# Patient Record
Sex: FEMALE | Race: WHITE
[De-identification: names, ages, dates, MRNs, and addresses within clinical notes are randomized per-mention and may not be internally consistent; named-entity substitution may affect disease eponyms.]

---

## 2017-06-22 ENCOUNTER — HOSPITAL ENCOUNTER (EMERGENCY)
Dept: HOSPITAL 58 - ED | Age: 32
Discharge: HOME | End: 2017-06-22

## 2017-06-22 VITALS — BODY MASS INDEX: 25 KG/M2

## 2017-06-22 VITALS — TEMPERATURE: 98.4 F | SYSTOLIC BLOOD PRESSURE: 104 MMHG | DIASTOLIC BLOOD PRESSURE: 71 MMHG
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
## 2017-06-22 DIAGNOSIS — F17.210: ICD-10-CM

## 2017-06-22 DIAGNOSIS — S71.111A: Primary | ICD-10-CM

## 2017-06-22 PROCEDURE — 99283 EMERGENCY DEPT VISIT LOW MDM: CPT

## 2017-06-22 NOTE — ED.PDOC
General


ED Provider: 


Dr. RAMSES ROCHA-ER





Chief Complaint: Laceration


Stated Complaint: i accidentally stabbed my thigh with a steak knife


Time Seen by Physician: 02:26


Mode of Arrival: Walk-In


Information Source: Patient


Exam Limitations: No limitations


Primary Care Provider: 


[facility]





Nursing and Triage Documentation Reviewed and Agree: Yes





Skin Complaint Exam





- Laceration/Lower Ext. Complaint/Exam


Location of Injury: Right, Thigh


Mechanism of Injury: Laceration


Onset/Duration: 1hr


Symptoms Are: Still present


Initial Severity: Mild


Current Severity: Mild


Aggravating: Movement


Alleviating: Compression


Associated Signs and Symptoms: Denies: Fever, Chills, Erythema, Numbness, 

Tingling


Differential Diagnoses: Laceration





Review of Systems





- Review Of Systems


Constitutional: Reports: No symptoms


Eyes: Reports: No symptoms


Ears, Nose, Mouth, Throat: Reports: No symptoms


Respiratory: Reports: No symptoms


Cardiac: Reports: No symptoms


GI: Reports: No symptoms


: Reports: No symptoms


Musculoskeletal: Reports: No symptoms


Skin: Reports: No symptoms


Neurological: Reports: No symptoms


Endocrine: Reports: No symptoms


Hematologic/Lymphatic: Reports: No symptoms


All Other Systems: Reviewed and Negative





Past Medical History





- Past Medical History


Previously Healthy: Yes


Endocrine: Reports: Dyslipidemia


Cardiovascular: Reports: None


Respiratory: Reports: None


Hematological: Reports: None


Gastrointestinal: Reports: None


Genitourinary: Reports: None


Neuro/Psych: Reports: None


Musculoskeletal: Reports: None


Cancer: Reports: None


Last Menstrual Period: 5 yrs ago





- Surgical History


General Surgical History: Reports: Tubal ligation, Tonsillectomy, Adenoidectomy

, Unknown





- Family History


Family History: Reports: Unknown





- Social History


Smoking Status: Current every day smoker, Heavy tobacco smoker


Hx Substance Use: No


Alcohol Screening: Occasionally


Lives: With family





- Immunizations


Tetanus Shot up to Date: Yes (received at clinic last year)





Physical Exam





- Physical Exam


Appearance: Well-appearing, No pain distress, Well-nourished


Pain Distress: Mild


Eyes: KALEIGH, EOMI, Conjunctiva clear


ENT: Ears normal, Nose normal, Oropharynx normal


Neck: Supple


Respiratory: Airway patent, Breath sounds clear, Breath sounds equal, 

Respirations nonlabored


Cardiovascular: RRR


GI/: Soft, Nontender, No masses, Bowel sounds normal, No Organomegaly


Musculoskeletal: Normal strength


Skin: Warm, Dry, Normal color


Neurological: Sensation intact, Motor intact, Reflexes intact, Cranial nerves 

intact, Alert, Oriented


Psychiatric: Affect appropriate, Mood appropriate





Procedures





- Laceration/Wound Repair


  ** No standard instances


Wound Description: Linear


Wound Length (cm): 1cm right thigh


Wound Explored: Clean


Wound Irrigated: No


Wound Prep: Hibiclens


Anesthesia: Lidocaine


Wound Repaired With: Sutures


Suture Size and Type: 2 4.0 prolene


Number of Sutures: 2


Layer Closure?: No


Sterile Dressing Applied?: Yes


Splint Applied?: No


Sling Applied?: No





Critical Care Note





- Critical Care Note


Total Time (mins): 0





Course





- Course


Orders, Labs, Meds: 





Orders











 Category Date Time Status


 


 Lidocaine HCl/Pf [Lidocaine 1 % Amp 5 ml (Sutures)] MEDS  06/22/17 02:14 

Discontinued





 5 ml SUBCUT ONCE STA   


 


 Tetanus, Diphtheria Tox,Adult [Tetanus Diphtheria MEDS  06/22/17 02:16 Once





 Toxoids]   





 0.5 ml IM .ONCE ONE   








Medications














Discontinued Medications














Generic Name Dose Route Start Last Admin





  Trade Name Freq  PRN Reason Stop Dose Admin


 


Lidocaine HCl  5 ml  06/22/17 02:14  06/22/17 02:24





  Lidocaine 1 % Amp 5 Ml (Sutures)  SUBCUT  06/22/17 02:15  5 ml





  ONCE STA   Administration


 


Tetanus/Diphtheria Toxoids  0.5 ml  06/22/17 02:16  





  Tetanus Diphtheria Toxoids  IM  06/22/17 02:17  





  .ONCE ONE   











Vital Signs: 





 











  Temp Pulse Resp BP Pulse Ox


 


 06/22/17 02:13  98.4 F  70  20  104/71  97














Departure





- Departure


Time of Disposition: 02:28


Disposition: HOME SELF-CARE


Discharge Problem: 


 Laceration - injury





Instructions:  Laceration (ED), Care For Your Stitches (ED)


Condition: Good


Pt referred to PMD for follow-up: Yes


Additional Instructions: 


suture care--out in 7 days--return if any signs of infection


Allergies/Adverse Reactions: 


Allergies





pseudoephedrine HCl [From Sudafed] Adverse Reaction (Verified 06/22/17 02:20)


 


 STATES FEELS NUMBNESS AND TINGLING WHEN SHE TAKES 








Home Medications: 


Ambulatory Orders





Ibuprofen 800 mg PO PRN 05/25/17 








Disposition Discussed With: Patient

## 2017-09-01 ENCOUNTER — HOSPITAL ENCOUNTER (EMERGENCY)
Dept: HOSPITAL 58 - ED | Age: 32
LOS: 1 days | Discharge: HOME | End: 2017-09-02

## 2017-09-01 VITALS — SYSTOLIC BLOOD PRESSURE: 135 MMHG | DIASTOLIC BLOOD PRESSURE: 74 MMHG | TEMPERATURE: 98.7 F

## 2017-09-01 VITALS — BODY MASS INDEX: 25 KG/M2

## 2017-09-01 DIAGNOSIS — K57.92: Primary | ICD-10-CM

## 2017-09-01 DIAGNOSIS — F17.210: ICD-10-CM

## 2017-09-01 PROCEDURE — 83690 ASSAY OF LIPASE: CPT

## 2017-09-01 PROCEDURE — 84703 CHORIONIC GONADOTROPIN ASSAY: CPT

## 2017-09-01 PROCEDURE — 81001 URINALYSIS AUTO W/SCOPE: CPT

## 2017-09-01 PROCEDURE — 99283 EMERGENCY DEPT VISIT LOW MDM: CPT

## 2017-09-01 PROCEDURE — 80053 COMPREHEN METABOLIC PANEL: CPT

## 2017-09-01 PROCEDURE — 82150 ASSAY OF AMYLASE: CPT

## 2017-09-01 PROCEDURE — 85025 COMPLETE CBC W/AUTO DIFF WBC: CPT

## 2017-09-01 PROCEDURE — 96374 THER/PROPH/DIAG INJ IV PUSH: CPT

## 2017-09-01 PROCEDURE — 96361 HYDRATE IV INFUSION ADD-ON: CPT

## 2017-09-01 PROCEDURE — 96375 TX/PRO/DX INJ NEW DRUG ADDON: CPT

## 2017-09-01 PROCEDURE — 36415 COLL VENOUS BLD VENIPUNCTURE: CPT

## 2017-09-01 PROCEDURE — 85651 RBC SED RATE NONAUTOMATED: CPT

## 2017-09-02 LAB
ADD URINE MICROSCOPIC: NO
ALBUMIN SERPL-MCNC: 3.3 G/DL (ref 3.4–5)
ALBUMIN/GLOB SERPL: 1.1 {RATIO}
ALP SERPL-CCNC: 25 U/L (ref 42–98)
ALT SERPL-CCNC: 13 U/L (ref 12–78)
AMYLASE SERPL-CCNC: 53 U/L (ref 25–115)
ANION GAP SERPL CALC-SCNC: 18.9 MMOL/L
AST SERPL-CCNC: 14 U/L (ref 15–37)
BASOPHILS # BLD AUTO: 0 K/UL (ref 0–0.2)
BASOPHILS NFR BLD AUTO: 0.4 % (ref 0–3)
BILIRUB SERPL-MCNC: 0.15 MG/DL (ref 0–1.2)
BUN SERPL-MCNC: 17 MG/DL (ref 7–18)
BUN/CREAT SERPL: 19.76
CALCIUM SERPL-MCNC: 9.1 MG/DL (ref 8.2–10.2)
CHLORIDE SERPL-SCNC: 106 MMOL/L (ref 98–107)
CO2 BLD-SCNC: 21 MMOL/L (ref 21–32)
CREAT SERPL-MCNC: 0.86 MG/DL (ref 0.6–1.3)
EOSINOPHIL # BLD AUTO: 0.2 K/UL (ref 0–0.7)
EOSINOPHIL NFR BLD AUTO: 1.9 % (ref 0–7)
ERYTHROCYTE [SEDIMENTATION RATE] IN BLOOD: 16 MM/HR (ref 0–20)
ESR INTERNAL QC: (no result)
GFR SERPLBLD BASED ON 1.73 SQ M-ARVRAT: 76 ML/MIN
GLOBULIN SER CALC-MCNC: 3 G/L
GLUCOSE SERPL-MCNC: 103 MG/DL (ref 70–110)
HCT VFR BLD AUTO: 36.2 % (ref 37–47)
HGB BLD-MCNC: 12.7 G/DL (ref 12–16)
IMM GRANULOCYTES NFR BLD AUTO: 0.3 % (ref 0–5)
LIPASE SERPL-CCNC: 29 U/L (ref 8–78)
LYMPHOCYTES # SPEC AUTO: 3.4 K/UL (ref 0.6–3.4)
LYMPHOCYTES NFR BLD AUTO: 31.7 % (ref 10–50)
MCH RBC QN: 30.4 PG (ref 27–31)
MCHC RBC AUTO-ENTMCNC: 35.1 G/DL (ref 31.8–35.4)
MCV RBC: 86.6 FL (ref 81–99)
MONOCYTES # BLD AUTO: 0.7 K/UL (ref 0.4–2)
MONOCYTES NFR BLD AUTO: 6.9 % (ref 0–10)
NEUTROPHILS # BLD AUTO: 6.3 K/UL (ref 2–6.9)
NEUTROPHILS NFR BLD AUTO: 58.8 %
PH UR: 5 [PH] (ref 5–9)
PLATELET # BLD AUTO: 230 10^3/UL (ref 140–440)
POTASSIUM SERPL-SCNC: 3.9 MMOL/L (ref 3.5–5.1)
PROT SERPL-MCNC: 6.3 G/DL (ref 6.4–8.2)
RBC # BLD AUTO: 4.18 10^6/UL (ref 4.2–5.4)
SERUM PREGNANCY INTERNAL QC: (no result)
SODIUM SERPL-SCNC: 142 MMOL/L (ref 136–145)
SP GR UR: 1.02 (ref 1–1.03)
WBC # BLD AUTO: 10.71 K/UL (ref 4.6–10.2)

## 2017-09-02 RX ADMIN — Medication PRN SYR: at 00:10

## 2017-09-02 RX ADMIN — Medication PRN SYR: at 00:18

## 2017-09-02 NOTE — CT
Exam:  CT of the abdomen and pelvis without and with contrast 

  

History:  Right lower quadrant pain 

  

Technique:  3 mm CT of the abdomen and pelvis without and with intravascular contrast 

  

FINDINGS: The lung bases are clear.  The gallbladder appears normal. Kidneys and proximal collecting
 system are unremarkable. The appendix is normal.  Inflammation of the right colonic mesentery and t
race fluid in the right paracolic gutter.  Central offending saccular diverticula is present.  Right
 colonic diverticulosis noted.  Trace atherosclerotic calcification of the aorta. 

  

Pelvic genitourinary structures appear normal. Pelvic bowel loops are unremarkable. No inflammatory 
change in the pelvic fat. No acute abnormality of the abdominal or pelvic skeleton. 

  

Impression: 

1.  Right colonic diverticulitis.  No evidence of abscess or free intraperitoneal gas.

## 2017-09-02 NOTE — ED.PDOC
General


ED Provider: 


Dr. RAMSES ROCHA-ER





Chief Complaint: Abdominal Pain


Stated Complaint: mari been hurting


Time Seen by Physician: 23:35


Mode of Arrival: Walk-In


Information Source: Patient


Exam Limitations: No limitations


[facility]





Nursing and Triage Documentation Reviewed and Agree: Yes





GI Complaint Exam





- Abdominal Pain Complaint/Exam


Onset: Gradual


Duration: several days


Symptoms Are: Still present


Timing: Intermittent


Initial Severity: Mild


Current Severity: Mild


Location of Pain: RLQ


Character: Reports: Dull, Aching


Alleviating: Reports: None


Associated Signs and Symptoms: Denies: Diaphoresis, Fever, Cough, Chest pain, 

Dizziness, Back pain, Constipation, Blood in stool, Dysuria, Urinary frequency, 

Decreased urine output, Decreased appetite, Vaginal bleeding, Vaginal discharge

, Nausea, Vomiting, Diarrhea, Sore throat, Decreased activity


AAA Risk Factors: Reports: None


Patient Rh Status: Unknown


Abdominal Findings: Present: None





Review of Systems





- Review Of Systems


Constitutional: Reports: No symptoms


Eyes: Reports: No symptoms


Ears, Nose, Mouth, Throat: Reports: No symptoms


Respiratory: Reports: No symptoms


Cardiac: Reports: No symptoms


GI: Reports: Abdominal pain


: Reports: No symptoms


Musculoskeletal: Reports: No symptoms


Skin: Reports: No symptoms


Neurological: Reports: No symptoms


Endocrine: Reports: No symptoms


Hematologic/Lymphatic: Reports: No symptoms


All Other Systems: Reviewed and Negative





Past Medical History





- Past Medical History


Previously Healthy: Yes


Endocrine: Reports: Dyslipidemia


Cardiovascular: Reports: None


Respiratory: Reports: None


Hematological: Reports: None


Gastrointestinal: Reports: None


Genitourinary: Reports: None


Neuro/Psych: Reports: None


Musculoskeletal: Reports: None


Cancer: Reports: None


Last Menstrual Period: PRESENTLY





- Surgical History


General Surgical History: Reports: Tubal ligation, Tonsillectomy, Adenoidectomy

, Unknown





- Family History


Family History: Reports: Unknown





- Social History


Smoking Status: Current every day smoker, Heavy tobacco smoker


Hx Substance Use: No


Alcohol Screening: None


Lives: With family





- Immunizations


Tetanus Shot up to Date: Yes





Physical Exam





- Physical Exam


Appearance: Well-appearing, No pain distress, Well-nourished


Pain Distress: Mild


Eyes: KALEIGH, EOMI, Conjunctiva clear


ENT: Ears normal, Nose normal, Oropharynx normal


Neck: Supple


Respiratory: Airway patent, Breath sounds clear, Breath sounds equal, 

Respirations nonlabored


Cardiovascular: RRR


GI/: Soft, Nontender, No masses, Bowel sounds normal, No Organomegaly


Musculoskeletal: Normal strength, ROM intact, No edema, No calf tenderness


Skin: Warm


Neurological: Sensation intact


Psychiatric: Affect appropriate





Interpretation





- Radiology Interpretation


Radiology Interpretation By: Radiologist


Radiology Results: Negative


Exam Interpreted: CT Scan





Critical Care Note





- Critical Care Note


Total Time (mins): 0





Course





- Course


Hematology/Chemistry: 


 09/02/17 00:05





 09/02/17 00:05


Orders, Labs, Meds: 





Lab Review











  09/02/17





  00:05


 


WBC  10.71 H


 


RBC  4.18 L


 


Hgb  12.7


 


Hct  36.2 L


 


MCV  86.6


 


MCH  30.4


 


MCHC  35.1


 


RDW Coeff of Tray  13.4


 


Plt Count  230


 


Immature Gran % (Auto)  0.3


 


Neut % (Auto)  58.8


 


Lymph % (Auto)  31.7


 


Mono % (Auto)  6.9


 


Eos % (Auto)  1.9


 


Baso % (Auto)  0.4


 


Immature Gran # (Auto)  0.0


 


Neut #  6.3


 


Lymph #  3.4


 


Mono #  0.7


 


Eos #  0.2


 


Baso #  0.0


 


ESR  16


 


Sodium  142


 


Potassium  3.9


 


Chloride  106


 


Carbon Dioxide  21


 


Anion Gap  18.9


 


BUN  17


 


Creatinine  0.86


 


Estimated GFR (MDRD)  76.00


 


BUN/Creatinine Ratio  19.76


 


Glucose  103


 


Calcium  9.1


 


Total Bilirubin  0.15


 


AST  14 L


 


ALT  13


 


Alkaline Phosphatase  25 L


 


Total Protein  6.3 L


 


Albumin  3.3 L


 


Globulin  3.0


 


Albumin/Globulin Ratio  1.10


 


Amylase  53


 


Lipase  29


 


Serum Pregnancy, Qual  Negative


 


Urine Color  Yellow


 


Urine Clarity  Clear


 


Urine pH  5.0


 


Ur Specific Gravity  1.025


 


Urine Protein  Negative


 


Urine Glucose (UA)  Negative


 


Urine Ketones  Negative


 


Urine Blood  Negative


 


Urine Nitrite  Negative


 


Urine Bilirubin  Negative


 


Urine Urobilinogen  0.2


 


Ur Leukocyte Esterase  Negative








Orders











 Category Date Time Status


 


 NPO REMINDER: IMAGING ONCE CARE  09/01/17 23:55 Completed


 


 ED IV/MEDIPORT/POWERPORT .ONCE EMERGENCY  09/01/17 23:54 Active


 


 AMYLASE Stat LAB  09/01/17 23:54 Completed


 


 CBC W/ AUTO DIFF Stat LAB  09/01/17 23:54 Completed


 


 COMPREHENSIVE METABOLIC PANEL Stat LAB  09/01/17 23:54 Completed


 


 ESR Stat LAB  09/01/17 23:54 Completed


 


 LIPASE Stat LAB  09/01/17 23:54 Completed


 


 SERUM PREGNANCY Stat LAB  09/01/17 23:54 Completed


 


 URINALYSIS C & S IF INDICATED Stat LAB  09/02/17 00:05 Completed


 


 0.9 % Sodium Chloride [Saline Flush] MEDS  09/01/17 23:54 Ordered





 1 syr IVF PRN PRN   


 


 Morphine Sulfate [Morphine 2 mg/ml Syringe] MEDS  09/01/17 23:55 Discontinued





 2 mg IVP ONCE STA   


 


 Ondansetron HCl/Pf [Zofran 4 mg/2 ml] MEDS  09/01/17 23:55 Discontinued





 4 mg IVP ONCE STA   


 


 Sodium Chloride 0.9% [Sodium Chloride] 1,000 ml MEDS  09/01/17 23:54 Active





  mls/hr   


 


 CT ABDOMEN/PELVIS W/WO CONTRAS Stat RADS  09/01/17 23:54 Completed








Medications











Generic Name Dose Route Start Last Admin





  Trade Name Freq  PRN Reason Stop Dose Admin


 


Sodium Chloride  1,000 mls @ 100 mls/hr  09/01/17 23:54  09/02/17 00:12





  Sodium Chloride  IV  09/02/17 09:53  100 mls/hr





  .Q10H STA   Administration


 


Sodium Chloride  1 syr  09/01/17 23:54  09/02/17 00:18





  Saline Flush  IVF   1 syr





  PRN PRN   Administration





  To flush IV   














Discontinued Medications














Generic Name Dose Route Start Last Admin





  Trade Name Freq  PRN Reason Stop Dose Admin


 


Morphine Sulfate  2 mg  09/01/17 23:55  09/02/17 00:14





  Morphine 2 Mg/Ml Syringe  IVP  09/01/17 23:56  2 mg





  ONCE STA   Administration


 


Ondansetron HCl  4 mg  09/01/17 23:55  09/02/17 00:13





  Zofran 4 Mg/2 Ml  IVP  09/01/17 23:56  4 mg





  ONCE STA   Administration











Vital Signs: 





 











  Temp Pulse Resp BP Pulse Ox


 


 09/01/17 23:32  98.7 F  69  18  135/74  98














Departure





- Departure


Time of Disposition: 01:59


Disposition: HOME SELF-CARE


Discharge Problem: 


Diverticulitis


Qualifiers:


 Diverticulitis site: unspecified part of intestinal tract Diverticulitis 

bleeding: without bleeding Diverticulitis complication: without perforation or 

abscess Qualifier Code: (K57.92) Diverticulitis of intestine, part unspecified, 

without perforation or abscess without bleeding





Instructions:  Diverticulitis (ED)


Condition: Good


Pt referred to PMD for follow-up: Yes


Additional Instructions: 


cipro 500mg bid x 7days--flagyl 500mg tid x 7 days--librax q 8hrs prn pain #15--

f/u with clinic next week


Allergies/Adverse Reactions: 


Allergies





pseudoephedrine HCl [From Sudafed] Adverse Reaction (Verified 09/01/17 23:43)


 


 STATES FEELS NUMBNESS AND TINGLING WHEN SHE TAKES 








Home Medications: 


Ambulatory Orders





1 [No Reported Medications]  09/01/17 








Disposition Discussed With: Patient

## 2018-05-27 ENCOUNTER — HOSPITAL ENCOUNTER (EMERGENCY)
Dept: HOSPITAL 58 - ED | Age: 33
Discharge: HOME | End: 2018-05-27

## 2018-05-27 VITALS — TEMPERATURE: 97.5 F | DIASTOLIC BLOOD PRESSURE: 76 MMHG | SYSTOLIC BLOOD PRESSURE: 126 MMHG

## 2018-05-27 VITALS — BODY MASS INDEX: 25.2 KG/M2
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
## 2018-05-27 DIAGNOSIS — F17.210: ICD-10-CM

## 2018-05-27 DIAGNOSIS — L30.9: Primary | ICD-10-CM

## 2018-05-27 DIAGNOSIS — S90.861A: ICD-10-CM

## 2018-05-27 DIAGNOSIS — S40.861A: ICD-10-CM

## 2018-05-27 PROCEDURE — 96372 THER/PROPH/DIAG INJ SC/IM: CPT

## 2018-05-27 PROCEDURE — 99282 EMERGENCY DEPT VISIT SF MDM: CPT

## 2018-05-27 NOTE — ED.PDOC
General


ED Provider: 


Dr. TRISTEN WILLIAMSON





Chief Complaint: Bite


Stated Complaint: Patient is a 33 year old female who comes to the Er with a 

possible bite on the right arm and right heel. States it is now itiching 

slightly raised and read.


Time Seen by Physician: 23:01


Mode of Arrival: Walk-In


Information Source: Patient


Exam Limitations: No limitations


[facility] 


[facility]





Nursing and Triage Documentation Reviewed and Agree: No


Reviewed sepsis parameters & appropriate labs ordered?: No


System Inflammatory Response Syndrome: Not Applicable


Sepsis Protocol: 


For patient's 13 years and over:





Temp is 96.8 and below  and greater


Pulse >90 BPM


Resp >20/minute


Acutely Altered Mental Status





Are patient's symptoms suggestive of a new infection, such as:


   -Pneumonia


   -Skin, Soft Tissue


   -Endocarditis


   -UTI


   -Bone, Joint Infection


   -Implantable Device


   -Acute Abdominal Infection


   -Wound Infection


   -Meningitis


   -Blood Stream Catheter Infection


   -Unknown





System Inflammatory Response Syndrome: Not Applicable





Skin Complaint Exam





- Skin Rash/Itching Complaint/Exam


Onset/Duration: 2 days 


Symptoms Are: Still present


Initial Severity: Moderate


Current Severity: Moderate


Location: Rigth arm- Triceps area and right heel 


Potential Exposures: Reports: Insect bite


Prior Treatment: Bendryl 3 pm 


Aggravating: Reports: Showering


Associated Signs and Symptoms: Denies: Difficulty breathing, Fever, Chills


Skin Findings: Present: Urticaria, Maculae


Body Picture: 


  __________________________














  __________________________





 1 - swelling raised and itchy.





Differential Diagnoses: Allergic Reaction, Contact Dermatitis, Drug Rash, 

Urticaria





Review of Systems





- Review Of Systems


Constitutional: Reports: No symptoms


Eyes: Reports: No symptoms


Ears, Nose, Mouth, Throat: Reports: No symptoms


Respiratory: Reports: No symptoms


Cardiac: Reports: No symptoms


GI: Reports: No symptoms


: Reports: No symptoms


Musculoskeletal: Reports: No symptoms


Skin: Reports: Rash


Neurological: Reports: No symptoms


Endocrine: Reports: No symptoms


Hematologic/Lymphatic: Reports: No symptoms


All Other Systems: Reviewed and Negative





Past Medical History





- Past Medical History


Previously Healthy: Yes


Endocrine: Reports: Dyslipidemia


Cardiovascular: Reports: None


Respiratory: Reports: None


Hematological: Reports: None


Gastrointestinal: Reports: GERD, Other (H pyroli )


Genitourinary: Reports: None


Neuro/Psych: Reports: Anxiety


Musculoskeletal: Reports: None


Cancer: Reports: None


Last Menstrual Period: 1 week ago





- Surgical History


General Surgical History: Reports: Tubal ligation, Tonsillectomy, Adenoidectomy

, Other (Breast augmentation. Uterine ablation, D and C )





- Family History


Family History: Reports: Unknown





- Social History


Smoking Status: Current every day smoker, Heavy tobacco smoker


Hx Substance Use: No


Alcohol Screening: None





- Immunizations


Tetanus Shot up to Date: Yes





Physical Exam





- Physical Exam


Appearance: Ill-appearing


Ill-appearing: Moderate


Pain Distress: Mild


Eyes: KALEIGH, EOMI, Conjunctiva clear


ENT: Ears normal, Nose normal, Oropharynx normal


Neck: Supple


Respiratory: Airway patent, Breath sounds clear, Breath sounds equal, 

Respirations nonlabored


Cardiovascular: RRR


GI/: Soft, Nontender, No masses, Bowel sounds normal, No Organomegaly


Musculoskeletal: Normal strength, ROM intact, No edema, No calf tenderness


Skin: Warm, Dry


Psychiatric: Anxious





Critical Care Note





- Critical Care Note


Total Time (mins): 0





Course





- Course


Orders, Labs, Meds: 


Orders











 Category Date Time Status


 


 Ceftriaxone Sodium [Rocephin] MEDS  05/27/18 23:01 Discontinued





 1 gm IM ONCE STA   


 


 Diphenhydramine HCl [Benadryl] MEDS  05/27/18 23:02 Discontinued





 50 mg PO ONCE STA   


 


 Lidocaine HCl/Pf [Lidocaine HCl 1% Sdv] MEDS  05/27/18 23:01 Discontinued





 2.1 ml IM ONCE STA   


 


 Methylprednisolone Sod Succ/Pf [Solu-Medrol 125 mg] MEDS  05/27/18 23:01 

Discontinued





 125 mg IM ONCE STA   








Medications














Discontinued Medications














Generic Name Dose Route Start Last Admin





  Trade Name Freq  PRN Reason Stop Dose Admin


 


Ceftriaxone Sodium  1 gm  05/27/18 23:01  05/27/18 23:11





  Rocephin  IM  05/27/18 23:02  1 gm





  ONCE STA   Administration





     





     





     





     


 


Diphenhydramine HCl  50 mg  05/27/18 23:02  05/27/18 23:09





  Benadryl  PO  05/27/18 23:03  50 mg





  ONCE STA   Administration





     





     





     





     


 


Lidocaine HCl  2.1 ml  05/27/18 23:01  05/27/18 23:11





  Lidocaine Hcl 1% Sdv  IM  05/27/18 23:02  2.1 ml





  ONCE STA   Administration





     





     





     





     


 


Methylprednisolone Sodium Succinate  125 mg  05/27/18 23:01  05/27/18 23:11





  Solu-Medrol 125 Mg  IM  05/27/18 23:02  125 mg





  ONCE STA   Administration





     





     





     





     











Vital Signs: 


 











  Temp Pulse Resp BP Pulse Ox


 


 05/27/18 22:33  97.5 F L  88  20  126/76  98














Departure





- Departure


Time of Disposition: 23:30


Disposition: HOME SELF-CARE


Discharge Problem: 


 Dermatitis





Instructions:  Insect Bite or Sting (ED)


Condition: Stable


Pt referred to PMD for follow-up: Yes


IPMP verified?: No


Additional Instructions: 


Take Medication as prescribed 


Follow up with PCP in 3 days. 


Prescriptions: 


Cephalexin [Keflex] 500 mg PO Q8HR #30 capsule


Methylprednisolone [Medrol Dosepak] 4 mg PO AS DIRECTED #1 pkg


Allergies/Adverse Reactions: 


Allergies





pseudoephedrine HCl [From Sudafed] Adverse Reaction (Verified 05/27/18 22:39)


 


 STATES FEELS NUMBNESS AND TINGLING WHEN SHE TAKES 








Home Medications: 


Ambulatory Orders





Cephalexin [Keflex] 500 mg PO Q8HR #30 capsule 05/27/18 


Methylprednisolone [Medrol Dosepak] 4 mg PO AS DIRECTED #1 pkg 05/27/18 








Disposition Discussed With: Patient, Family

## 2019-06-21 ENCOUNTER — HOSPITAL ENCOUNTER (EMERGENCY)
Dept: HOSPITAL 58 - ED | Age: 34
Discharge: HOME | End: 2019-06-21

## 2019-06-21 VITALS — TEMPERATURE: 98.1 F | DIASTOLIC BLOOD PRESSURE: 85 MMHG | SYSTOLIC BLOOD PRESSURE: 147 MMHG

## 2019-06-21 VITALS — BODY MASS INDEX: 22.2 KG/M2

## 2019-06-21 DIAGNOSIS — F43.0: Primary | ICD-10-CM

## 2019-06-21 DIAGNOSIS — F17.210: ICD-10-CM

## 2019-06-21 PROCEDURE — 85025 COMPLETE CBC W/AUTO DIFF WBC: CPT

## 2019-06-21 PROCEDURE — 84443 ASSAY THYROID STIM HORMONE: CPT

## 2019-06-21 PROCEDURE — 36415 COLL VENOUS BLD VENIPUNCTURE: CPT

## 2019-06-21 PROCEDURE — 93010 ELECTROCARDIOGRAM REPORT: CPT

## 2019-06-21 PROCEDURE — 99283 EMERGENCY DEPT VISIT LOW MDM: CPT

## 2019-06-21 PROCEDURE — 84703 CHORIONIC GONADOTROPIN ASSAY: CPT

## 2019-06-21 PROCEDURE — 80053 COMPREHEN METABOLIC PANEL: CPT

## 2019-06-21 PROCEDURE — 96372 THER/PROPH/DIAG INJ SC/IM: CPT

## 2019-06-21 PROCEDURE — 80307 DRUG TEST PRSMV CHEM ANLYZR: CPT

## 2019-06-21 PROCEDURE — 81001 URINALYSIS AUTO W/SCOPE: CPT

## 2019-06-21 PROCEDURE — 80306 DRUG TEST PRSMV INSTRMNT: CPT

## 2019-06-21 PROCEDURE — 93005 ELECTROCARDIOGRAM TRACING: CPT

## 2019-06-21 NOTE — ED.PDOC
General


ED Provider: 


Dr. SHIRA TOVAR





Chief Complaint: Non-specific Complaint


Stated Complaint: anxiety


Time Seen by Physician: 10:00


Mode of Arrival: Walk-In


Information Source: Patient


Exam Limitations: No limitations


Primary Care Provider: 


ALISHA HURT





Nursing and Triage Documentation Reviewed and Agree: Yes


Does patient meet sepsis criteria?: No


System Inflammatory Response Syndrome: Not Applicable


Sepsis Protocol: 


For patient's 13 years and over:





Temp is 96.8 and below  and greater


Pulse >90 BPM


Resp >20/minute


Acutely Altered Mental Status





Are patient's symptoms suggestive of a new infection, such as:


   -Pneumonia


   -Skin, Soft Tissue


   -Endocarditis


   -UTI


   -Bone, Joint Infection


   -Implantable Device


   -Acute Abdominal Infection


   -Wound Infection


   -Meningitis


   -Blood Stream Catheter Infection


   -Unknown








Psychological Complaint Exam





- Psychiatric Complaint/Exam


Patient Complains Of: Present: Depression, Other (panic attack)


Onset/Duration: today


Symptoms Are: Still present


Timing: Intermittent


Episodes Lasting: Minutes


Initial Severity: Moderate


Current Severity: Moderate


Character: Present: Depressed, Fearful, Anxious, Frustrated


Aggravating: Reports: Recent stress


Associated Signs And Symptoms: Reports: Sleep disturbance, Appetite change.  

Denies: Hostile, Confused, Hallucinating, Paranoid behavior


Related History: Denies: Suicidal thoughts, Suicidal plan, Suicidal gestures, 

Homicidal thoughts, Homicidal plan, Homicidal gestures, Prior attempts, Recent 

stressors, Drug ingestion


Completed Suicide Risk Factors: None


Patient Accompanied By: Family


Patient In Custody Of Police: No


Social Withdrawal Present: No


Social Isolation Present: No


Prior Suicide Attempt: No


Injury From Prior Suicide Attempt: No


Related Surgical History: Reports: None


Patient Uncooperative For Exam: No


Mood: Present: Anxious


Appearance: Present: Clean


Thought Process: Present: Logical


Insight: Present: Good


Memory: Intact


Judgement: Normal


Danger To Others: No


Patient Medically Stable For: Psych evaluation


Differential Diagnoses: Anxiety





Review of Systems





- Review Of Systems


Constitutional: Reports: No symptoms


Eyes: Reports: No symptoms


Ears, Nose, Mouth, Throat: Reports: No symptoms


Respiratory: Reports: No symptoms


Cardiac: Reports: No symptoms


GI: Reports: No symptoms


: Reports: No symptoms


Musculoskeletal: Reports: No symptoms


Skin: Reports: No symptoms


Neurological: Reports: Anxiety, Emotional problems


Endocrine: Reports: No symptoms


Hematologic/Lymphatic: Reports: No symptoms


All Other Systems: Reviewed and Negative





Past Medical History





- Past Medical History


Previously Healthy: Yes


Endocrine: Reports: Dyslipidemia


Cardiovascular: Reports: None


Respiratory: Reports: None


Hematological: Reports: None


Gastrointestinal: Reports: GERD, Other (H pyroli )


Genitourinary: Reports: None


Neuro/Psych: Reports: Anxiety


Musculoskeletal: Reports: None


Cancer: Reports: None


Last Menstrual Period: 2 weeks





- Surgical History


General Surgical History: Reports: Tubal ligation, Tonsillectomy, Adenoidectomy

, Other (Breast augmentation. Uterine ablation, D and C )





- Family History


Family History: Reports: Unknown





- Social History


Smoking Status: Current every day smoker, Heavy tobacco smoker


Hx Substance Use: No


Alcohol Screening: Occasionally





Physical Exam





- Physical Exam


Appearance: Well-appearing, No pain distress, Well-nourished


Eyes: KALEIGH, EOMI, Conjunctiva clear


ENT: Ears normal, Nose normal, Oropharynx normal


Respiratory: Airway patent, Breath sounds clear, Breath sounds equal, 

Respirations nonlabored


Cardiovascular: RRR, Pulses normal, No rub, No murmur


GI/: Soft, Nontender, No masses, Bowel sounds normal, No Organomegaly


Musculoskeletal: Normal strength, ROM intact, No edema, No calf tenderness


Skin: Warm, Dry, Normal color


Neurological: Sensation intact, Motor intact, Reflexes intact, Cranial nerves 

intact, Alert, Oriented


Psychiatric: Affect appropriate, Mood appropriate





Critical Care Note





- Critical Care Note


Total Time (mins): 0





Course





- Course


Hematology/Chemistry: 


 06/21/19 11:18





 06/21/19 11:18


Orders, Labs, Meds: 





Lab Review











  06/21/19 06/21/19 06/21/19





  11:12 11:18 11:18


 


WBC   8.51 


 


RBC   4.69 


 


Hgb   14.5 


 


Hct   42.1 


 


MCV   89.8 


 


MCH   30.9 


 


MCHC   34.4 


 


RDW Coeff of Tray   13.2 


 


Plt Count   236 


 


Immature Gran % (Auto)   0.1 


 


Neut % (Auto)   65.9 


 


Lymph % (Auto)   26.0 


 


Mono % (Auto)   7.1 


 


Eos % (Auto)   0.4 


 


Baso % (Auto)   0.5 


 


Immature Gran # (Auto)   0.0 


 


Neut # (Auto)   5.6 


 


Lymph # (Auto)   2.2 


 


Mono # (Auto)   0.6 


 


Eos # (Auto)   0.0 


 


Baso # (Auto)   0.0 


 


Sodium    138.6


 


Potassium    4.41


 


Chloride    105.6


 


Carbon Dioxide    22.1


 


Anion Gap    15.31


 


BUN    18.8 H


 


Creatinine    0.70


 


Estimated GFR (MDRD)    95.00


 


BUN/Creatinine Ratio    26.85


 


Glucose    115.1 H


 


Calcium    10.00


 


Total Bilirubin    0.65


 


AST    22.6


 


ALT    15.2


 


Alkaline Phosphatase    26.8 L


 


Total Protein    7.80


 


Albumin    4.79


 


Globulin    3.01


 


Albumin/Globulin Ratio    1.59


 


Serum Pregnancy, Qual   


 


Urine Color  Yellow  


 


Urine Clarity  Clear  


 


Urine pH  5.5  


 


Ur Specific Gravity  1.020  


 


Urine Protein  Negative  


 


Urine Glucose (UA)  Negative  


 


Urine Ketones  Negative  


 


Urine Blood  Negative  


 


Urine Nitrite  Negative  


 


Urine Bilirubin  Negative  


 


Urine Urobilinogen  0.2  


 


Ur Leukocyte Esterase  Negative  


 


Salicylate Level mg/dL    < 1.00


 


Urine Opiates Screen   


 


Ur Oxycodone Screen   


 


Urine Methadone Screen   


 


Ur Propoxyphene Screen   


 


Acetaminophen    < 10.0 L


 


Ur Barbiturates Screen   


 


U Tricyclic Antidepress   


 


Ur Phencyclidine Scrn   


 


Ur Amphetamine Screen   


 


U Methamphetamines Scrn   


 


U Benzodiazepines Scrn   


 


Urine Cocaine Screen   


 


U Cannabinoids Screen   


 


Plasma/Serum Alcohol    < 10.0














  06/21/19 06/21/19





  11:18 11:18


 


WBC  


 


RBC  


 


Hgb  


 


Hct  


 


MCV  


 


MCH  


 


MCHC  


 


RDW Coeff of Tray  


 


Plt Count  


 


Immature Gran % (Auto)  


 


Neut % (Auto)  


 


Lymph % (Auto)  


 


Mono % (Auto)  


 


Eos % (Auto)  


 


Baso % (Auto)  


 


Immature Gran # (Auto)  


 


Neut # (Auto)  


 


Lymph # (Auto)  


 


Mono # (Auto)  


 


Eos # (Auto)  


 


Baso # (Auto)  


 


Sodium  


 


Potassium  


 


Chloride  


 


Carbon Dioxide  


 


Anion Gap  


 


BUN  


 


Creatinine  


 


Estimated GFR (MDRD)  


 


BUN/Creatinine Ratio  


 


Glucose  


 


Calcium  


 


Total Bilirubin  


 


AST  


 


ALT  


 


Alkaline Phosphatase  


 


Total Protein  


 


Albumin  


 


Globulin  


 


Albumin/Globulin Ratio  


 


Serum Pregnancy, Qual   Negative


 


Urine Color  


 


Urine Clarity  


 


Urine pH  


 


Ur Specific Gravity  


 


Urine Protein  


 


Urine Glucose (UA)  


 


Urine Ketones  


 


Urine Blood  


 


Urine Nitrite  


 


Urine Bilirubin  


 


Urine Urobilinogen  


 


Ur Leukocyte Esterase  


 


Salicylate Level mg/dL  


 


Urine Opiates Screen  Negative 


 


Ur Oxycodone Screen  Negative 


 


Urine Methadone Screen  Negative 


 


Ur Propoxyphene Screen  Negative 


 


Acetaminophen  


 


Ur Barbiturates Screen  Negative 


 


U Tricyclic Antidepress  Negative 


 


Ur Phencyclidine Scrn  Negative 


 


Ur Amphetamine Screen  Negative 


 


U Methamphetamines Scrn  Negative 


 


U Benzodiazepines Scrn  Negative 


 


Urine Cocaine Screen  Negative 


 


U Cannabinoids Screen  Positive 


 


Plasma/Serum Alcohol  








Orders











 Category Date Time Status


 


 EKG-(ED ONLY) Stat CARDIO  06/21/19 11:03 Completed


 


 ED CARDIAC MONITOR APPLIED ONCE EMERGENCY  06/21/19 11:03 Active


 


 Mental Health Consult [ED MENTAL HEALTH CONSULT] .ONCE EMERGENCY  06/21/19 11:

52 Ordered


 


 ACETAMINOPHEN Stat LAB  06/21/19 11:18 Completed


 


 BLOOD ALCOHOL Stat LAB  06/21/19 11:18 Completed


 


 CBC W/ AUTO DIFF Stat LAB  06/21/19 11:18 Completed


 


 COMPREHENSIVE METABOLIC PANEL Stat LAB  06/21/19 11:18 Completed


 


 DRUG SCREEN, URINE, RAPID Stat LAB  06/21/19 11:18 Completed


 


 SALICYLATE Stat LAB  06/21/19 11:18 Completed


 


 SERUM PREGNANCY Stat LAB  06/21/19 11:18 Completed


 


 TSH [THYROID STIMULATING HORMONE] Stat LAB  06/21/19 11:18 Received


 


 URINALYSIS C & S IF INDICATED Stat LAB  06/21/19 11:12 Completed


 


 Lorazepam [Ativan] MEDS  06/21/19 11:04 Discontinued





 1 mg IM ONCE STA   








Medications














Discontinued Medications














Generic Name Dose Route Start Last Admin





  Trade Name Freq  PRN Reason Stop Dose Admin


 


Lorazepam  1 mg  06/21/19 11:04  06/21/19 11:27





  Ativan  IM  06/21/19 11:05  1 mg





  ONCE STA   Administration





     





     





     





     











Vital Signs: 





 











  Temp Pulse Resp BP Pulse Ox


 


 06/21/19 09:59  98.1 F  89  20  147/85 H  99














Departure





- Departure


Time of Disposition: 12:03


Disposition: HOME SELF-CARE


Discharge Problem: 


 Panic attack as reaction to stress





Instructions:  Panic Attack (ED), Anxiety (ED)


Condition: Good


Pt referred to PMD for follow-up: Yes


IPMP verified?: No


Additional Instructions: 


Please call your Family Physician as soon as possible to schedule a follow-up 

appointment.


Allergies/Adverse Reactions: 


Allergies





pseudoephedrine HCl [From Sudafed] Adverse Reaction (Verified 05/27/18 22:39)


 


 STATES FEELS NUMBNESS AND TINGLING WHEN SHE TAKES 








Home Medications: 


Ambulatory Orders





Lorazepam [Ativan] 0.5 mg PO TID 12 Days  tablet 06/21/19 








Disposition Discussed With: Patient

## 2019-09-04 ENCOUNTER — HOSPITAL ENCOUNTER (OUTPATIENT)
Dept: HOSPITAL 58 - RAD | Age: 34
Discharge: HOME | End: 2019-09-04
Attending: OBSTETRICS & GYNECOLOGY

## 2019-09-04 VITALS — BODY MASS INDEX: 22.2 KG/M2

## 2019-09-04 DIAGNOSIS — N63.21: Primary | ICD-10-CM

## 2019-09-04 NOTE — US
EXAM:  Left breast ultrasound. 

  

History:  Left breast mass. 

  

Comparison:  Bilateral mammogram 09/04/2019 

  

Technique:  Multiple sonographic images through the left breast were obtained.  Color duplex Doppler 
was used to interrogate vascular flow. 

  

Findings:  At 2 o'clock within the anterior left breast 6 cm from nipple there is a 1.0 cm x 0.6 cm x
 1.3 cm complicated cyst with internal echoes.  This correlates with mammography and with the palpabl
e abnormality. 

  

Impression:  Probably benign left breast complicated cyst.  Recommend 6-month follow-up mammogram and
 ultrasound to document stability. 

  

BI-RADS 3, probably benign

## 2019-09-04 NOTE — MAMMO
EXAM:  Bilateral digital diagnostic mammogram (2-D and 3-D) 

  

History:  Left breast mass. 

  

Findings:  MLO and CC views of bilateral breasts demonstrate heterogeneously dense breast parenchyma 
which can obscure small lesions.  CAD was reviewed by the radiologist.  Tomosynthesis was performed. 
 There is a mass within the upper-outer quadrant of the left breast.  No suspicious microcalcificatio
ns. 

  

Impression:  Indeterminate left breast mass.  Recommend further evaluation with ultrasound. 

  

BI-RADS 0, incomplete.  Needs further evaluation